# Patient Record
Sex: FEMALE | Race: WHITE | Employment: OTHER | URBAN - METROPOLITAN AREA
[De-identification: names, ages, dates, MRNs, and addresses within clinical notes are randomized per-mention and may not be internally consistent; named-entity substitution may affect disease eponyms.]

---

## 2017-08-08 ENCOUNTER — OFFICE VISIT (OUTPATIENT)
Dept: NEUROSURGERY | Age: 77
End: 2017-08-08

## 2017-08-08 VITALS
TEMPERATURE: 97.9 F | OXYGEN SATURATION: 98 % | SYSTOLIC BLOOD PRESSURE: 125 MMHG | RESPIRATION RATE: 18 BRPM | HEART RATE: 63 BPM | DIASTOLIC BLOOD PRESSURE: 83 MMHG

## 2017-08-08 DIAGNOSIS — I67.1 MIDDLE CEREBRAL ARTERY ANEURYSM: Primary | ICD-10-CM

## 2017-08-08 RX ORDER — LEVOTHYROXINE SODIUM 88 UG/1
88 TABLET ORAL
COMMUNITY
Start: 2017-07-07

## 2017-08-08 RX ORDER — PANTOPRAZOLE SODIUM 40 MG/1
40 TABLET, DELAYED RELEASE ORAL DAILY
COMMUNITY
Start: 2017-06-26 | End: 2019-04-01

## 2017-08-08 NOTE — MR AVS SNAPSHOT
Visit Information Date & Time Provider Department Dept. Phone Encounter #  
 8/8/2017  2:30 PM Lord Gilbert MD [facility] Neurointerventional Surgery [phone] [id number] Follow-up Instructions Return in about 1 year (around 8/8/2018), or MRA and clinic visit. Upcoming Health Maintenance Date Due DTaP/Tdap/Td series (1 - Tdap) 11/18/1962 ZOSTER VACCINE AGE 60> 9/18/2001 GLAUCOMA SCREENING Q2Y 11/18/2006 OSTEOPOROSIS SCREENING (DEXA) 11/18/2006 Pneumococcal 65+ Low/Medium Risk (1 of 2 - PCV13) 11/18/2006 MEDICARE YEARLY EXAM 11/18/2006 INFLUENZA AGE 9 TO ADULT 8/1/2017 Allergies as of 8/8/2017  Review Complete On: 8/8/2017 By: Juan Michelle CNA No Known Allergies Current Immunizations  Reviewed on 5/28/2015 No immunizations on file. Not reviewed this visit You Were Diagnosed With   
  
 Codes Comments Middle cerebral artery aneurysm    -  Primary ICD-10-CM: I67.1 ICD-9-CM: 437.3 Vitals BP Pulse Temp Resp SpO2 OB Status 125/83 (BP 1 Location: Right arm, BP Patient Position: Sitting) 63 97.9 °F (36.6 °C) (Oral) 18 98% Postmenopausal  
 Smoking Status Never Smoker Your Updated Medication List  
  
   
This list is accurate as of: 8/8/17  2:52 PM.  Always use your most recent med list.  
  
  
  
  
 aspirin 325 mg tablet Commonly known as:  ASPIRIN Take 1 Tab by mouth daily. calcium 500 mg Tab Take  by mouth. cyclobenzaprine 10 mg tablet Commonly known as:  FLEXERIL Take 1 Tab by mouth three (3) times daily as needed for Muscle Spasm(s). diclofenac EC 75 mg EC tablet Commonly known as:  VOLTAREN Take 75 mg by mouth. prn  
  
 levothyroxine 88 mcg tablet Commonly known as:  SYNTHROID Take 88 mcg by mouth daily. lisinopril 5 mg tablet Commonly known as:  Heather Jose Take  by mouth daily. pantoprazole 40 mg tablet Commonly known as:  PROTONIX Take 40 mg by mouth daily. TURMERIC (BULK)  
by Does Not Apply route daily. Follow-up Instructions Return in about 1 year (around 8/8/2018), or MRA and clinic visit. To-Do List   
 08/08/2017 Imaging:  MRA BRAIN W CONT Introducing [facility] SERVICES! [facility] introduces [employer] patient portal. Now you can access parts of your medical record, email your doctor's office, and request medication refills online. 1. In your internet browser, go to [email or web address] 2. Click on the First Time User? Click Here link in the Sign In box. You will see the New Member Sign Up page. 3. Enter your [employer] Access Code exactly as it appears below. You will not need to use this code after youve completed the sign-up process. If you do not sign up before the expiration date, you must request a new code. · [employer] Access Code: [email or web address] Expires: 11/6/2017  2:52 PM 
 
4. Enter the last four digits of your Social Security Number (xxxx) and Date of Birth (mm/dd/yyyy) as indicated and click Submit. You will be taken to the next sign-up page. 5. Create a [employer] ID. This will be your [employer] login ID and cannot be changed, so think of one that is secure and easy to remember. 6. Create a [employer] password. You can change your password at any time. 7. Enter your Password Reset Question and Answer. This can be used at a later time if you forget your password. 8. Enter your e-mail address. You will receive e-mail notification when new information is available in [address] 9. Click Sign Up. You can now view and download portions of your medical record. 10. Click the Download Summary menu link to download a portable copy of your medical information. If you have questions, please visit the Frequently Asked Questions section of the [employer] website.  Remember, [employer] is NOT to be used for urgent needs. For medical emergencies, dial 911. Now available from your iPhone and Android! Please provide this summary of care documentation to your next provider. Your primary care clinician is listed as [address]. If you have any questions after today's visit, please call [phone].

## 2018-08-16 ENCOUNTER — HOSPITAL ENCOUNTER (OUTPATIENT)
Dept: MRI IMAGING | Age: 78
Discharge: HOME | End: 2018-08-16
Attending: NURSE PRACTITIONER
Payer: MEDICARE

## 2018-08-16 VITALS — BODY MASS INDEX: 26.29 KG/M2 | WEIGHT: 158 LBS

## 2018-08-16 DIAGNOSIS — I67.1 MIDDLE CEREBRAL ARTERY ANEURYSM: ICD-10-CM

## 2018-08-16 PROCEDURE — 70546 MR ANGIOGRAPH HEAD W/O&W/DYE: CPT

## 2018-08-16 PROCEDURE — 74011250636 HC RX REV CODE- 250/636: Performed by: NURSE PRACTITIONER

## 2018-08-16 PROCEDURE — A9585 GADOBUTROL INJECTION: HCPCS | Performed by: NURSE PRACTITIONER

## 2018-08-16 PROCEDURE — 74011000258 HC RX REV CODE- 258: Performed by: NURSE PRACTITIONER

## 2018-08-16 RX ADMIN — SODIUM CHLORIDE 100 ML: 900 INJECTION, SOLUTION INTRAVENOUS at 09:00

## 2018-08-16 RX ADMIN — GADOBUTROL 7.5 ML: 604.72 INJECTION INTRAVENOUS at 09:00

## 2018-08-17 ENCOUNTER — OFFICE VISIT (OUTPATIENT)
Dept: NEUROSURGERY | Age: 78
End: 2018-08-17

## 2018-08-17 VITALS
HEART RATE: 74 BPM | RESPIRATION RATE: 18 BRPM | WEIGHT: 161 LBS | HEIGHT: 65 IN | TEMPERATURE: 98.6 F | BODY MASS INDEX: 26.82 KG/M2 | OXYGEN SATURATION: 98 % | DIASTOLIC BLOOD PRESSURE: 75 MMHG | SYSTOLIC BLOOD PRESSURE: 136 MMHG

## 2018-08-17 DIAGNOSIS — I67.1 MIDDLE CEREBRAL ARTERY ANEURYSM: Primary | ICD-10-CM

## 2018-08-17 NOTE — LETTER
8/17/2018 10:25 AM 
 
JASON Wright M.D. 
[address] [facility]  [id number] Sacha Coffey RE:  Ms. Adolfo Marycruz Dear Dr. India Salazar, 
 
I had the pleasure of seeing your patient, Ms. Marques Valenzuela, in our clinic today. As you know, Ms. Marques Valenzuela is a 69 y.o. 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  Ms. Marques Valenzuela presented to [facility] on 5/24/2015 for further evaluation of neck pain which had caused her to seek treatment at a [facility].  The neck pain was without n/v, visual deficits, weakness, congestion, dysphagia or dysarthia. At [facility] a CTA demonstrated a macrolobulated aneurysm arising from the distal M1 at its trifurcation measuring 6 mm x 3.5 x 6.5 mm with approximately 1 mm neck. CT head showed no SAH and the aneurysm was felt to be incidental.  However, given its size and irregularity recommendation was to treat the aneurysm endovascularly. She underwent coiling on 5/27/15 and was discharged on 5/28/15.  Follow up MRA on 7/2/15 showed a tiny neck remnant, which has been unchanged on annual MRAs. In October, 2016 she was seen for a single episode of transient global amnesia which resolved and was not associated with focal neurological deficit. On a follow up visit in 2017, she had not expericenced any more TGA episodes.   
  
Since then she has done very well. She denies any episodes of TGA or focal neurological symptoms, no headaches or neck pain and no new medical issues. Her neurological exam today is normal.  I personally reviewed all of her neuroimaging and compared the most recent MRA to the angiogram and previous MRAs. There has been no change in the tiny 1.5 mm neck remnant involving the left MCA anterior division. No intracranial or extracranial ASVD is seen.   
 
Given the stability of the tiny neck remnant and no neurological issues, she can return for her surveillance MRA in two years. Please feel free to contact me at [phone] (office) or [phone](cell) with any questions. Sincerely, Anirudh Ernst M.D. 
  
Medical Director [facility] Neurointerventional Surgery Service Brett Escalante 
Professor, Departments of Radiology, Neurology and Neurological Surgery [facility]

## 2018-08-17 NOTE — MR AVS SNAPSHOT
[address] [facility] [address] [facility] [address] 
[phone] Patient: Leonardo Garner MRN:  [id number] Visit Information Date & Time Provider Department Dept. Phone Encounter #  
 8/17/2018 10:00 AM MD Radha Kemp [facility] Neurointerventional Surgery [phone] [id number] Follow-up Instructions Return in about 2 years (around 8/17/2020). Upcoming Health Maintenance Date Due DTaP/Tdap/Td series (1 - Tdap) 11/18/1962 ZOSTER VACCINE AGE 60> 9/18/2001 GLAUCOMA SCREENING Q2Y 11/18/2006 Bone Densitometry (Dexa) Screening 11/18/2006 Pneumococcal 65+ Low/Medium Risk (1 of 2 - PCV13) 11/18/2006 MEDICARE YEARLY EXAM 3/14/2018 Influenza Age 5 to Adult 8/1/2018 Allergies as of 8/17/2018  Review Complete On: 8/17/2018 By: Giana Alamo CNA No Known Allergies Current Immunizations  Reviewed on 5/28/2015 No immunizations on file. Not reviewed this visit You Were Diagnosed With   
  
 Codes Comments Middle cerebral artery aneurysm    -  Primary ICD-10-CM: I67.1 ICD-9-CM: 437.3 Vitals BP Pulse Temp Resp Height(growth percentile) Weight(growth percentile) 136/75 (BP 1 Location: Right arm, BP Patient Position: Sitting) 74 98.6 °F (37 °C) (Oral) 18 5' 5\" (1.651 m) 161 lb (73 kg) SpO2 BMI OB Status Smoking Status 98% 26.79 kg/m2 Postmenopausal Never Smoker BMI and BSA Data Body Mass Index Body Surface Area  
 26.79 kg/m 2 1.83 m 2 Your Updated Medication List  
  
   
This list is accurate as of 8/17/18 10:14 AM.  Always use your most recent med list.  
  
  
  
  
 aspirin 325 mg tablet Commonly known as:  ASPIRIN Take 1 Tab by mouth daily. calcium 500 mg Tab Take  by mouth. cyclobenzaprine 10 mg tablet Commonly known as:  FLEXERIL Take 1 Tab by mouth three (3) times daily as needed for Muscle Spasm(s). diclofenac EC 75 mg EC tablet Commonly known as:  VOLTAREN Take 75 mg by mouth. prn  
  
 levothyroxine 88 mcg tablet Commonly known as:  SYNTHROID Take 88 mcg by mouth daily. lisinopril 5 mg tablet Commonly known as:  Marge Megan Take  by mouth daily. pantoprazole 40 mg tablet Commonly known as:  PROTONIX Take 40 mg by mouth daily. TURMERIC (BULK)  
by Does Not Apply route daily. Follow-up Instructions Return in about 2 years (around 8/17/2020). Patient Instructions Imaging reviewed with you and there is no change in the tiny neck remnant of the left MCA aneurysm No narrowing of any blood vessels are seen Recommend follow up MRA with and without contrast in 2 years Introducing \[facility] [place] [facility]\"" & [facility]! Michelle Masters introduces [employer] patient portal. Now you can access parts of your medical record, email your doctor's office, and request medication refills online. 1. In your internet browser, go to [email or web address] 2. Click on the First Time User? Click Here link in the Sign In box. You will see the New Member Sign Up page. 3. Enter your [employer] Access Code exactly as it appears below. You will not need to use this code after youve completed the sign-up process. If you do not sign up before the expiration date, you must request a new code. · [employer] Access Code: [email or web address] Expires: 10/28/2018 11:16 AM 
 
4. Enter the last four digits of your Social Security Number (xxxx) and Date of Birth (mm/dd/yyyy) as indicated and click Submit. You will be taken to the next sign-up page. 5. Create a [employer] ID. This will be your [employer] login ID and cannot be changed, so think of one that is secure and easy to remember. 6. Create a [employer] password. You can change your password at any time. 7. Enter your Password Reset Question and Answer. This can be used at a later time if you forget your password. 8. Enter your e-mail address. You will receive e-mail notification when new information is available in [address] 9. Click Sign Up. You can now view and download portions of your medical record. 10. Click the Download Summary menu link to download a portable copy of your medical information. If you have questions, please visit the Frequently Asked Questions section of the [employer] website. Remember, [employer] is NOT to be used for urgent needs. For medical emergencies, dial 911. Now available from your iPhone and Android! Please provide this summary of care documentation to your next provider. Your primary care clinician is listed as [address]. If you have any questions after today's visit, please call [phone].

## 2018-08-17 NOTE — PATIENT INSTRUCTIONS
Imaging reviewed with you and there is no change in the tiny neck remnant of the left MCA aneurysm    No narrowing of any blood vessels are seen    Recommend follow up MRA with and without contrast in 2 years

## 2018-08-17 NOTE — PROGRESS NOTES
Neurointerventional Surgery  Ambulatory Progress Note        Patient: Lorenzo Walton MRN: [id number]  SSN: [id number]    YOB: 1940             HPI:  Ms. Kenyon Pearce is a 77 y.o. Female who comes in today for follow up evaluation of her coiled left MCA anterior temporal artery aneurysm found incidentally after work up for acute onset of R sided neck pain. At that time, she presented to [facility] for further evaluation neck pain without n/v, visual deficits, weakness, congestion, dysphagia or dysarthia. CTA completed demonstrated a macrolobulated aneurysm arising from the distal M1 at its trifurcation. 6 mm x 3.5 x 6.5 mm with approximately 1 mm neck. NIS consulted and it was determined she should be treated per Dr Tasha Byrd, given its size and irregularity. She underwent coiling on 5/27/15 and was discharged on 5/28/15. Follow up MRA on 7/2/15 showed a tiny neck remnant, which is unchanged on 11/18/15. She was seen again for a 90 min episode of transient global amnesia on 10/16/16. MRI and MRA were performed at that time and showed no change in the tiny aneurysm neck remnant and normal brain parenchyma. Because she had the typical annual imaging done at that time, she did not have a repeat study in Dec, 2016. She returns today to review her imaging and for her annual check up.     PMHx includes Goiter, HTN and Sciatica involving the left leg. Previous neck pain has resolved. No more instances of TGA. Neurologic Exam:  Mental Status:                                 Alert and oriented x 4. Appropriate affect, mood and behavior.        Language:                                       Normal fluency, repetition, comprehension and naming.     Cranial Nerves:                            Pupils equal, round and reactive to light. Visual fields full to confrontation. Extraocular movements intact. Facial sensation intact V1 - V3. Full facial strength, no asymmetry. Hearing intact bilaterally. No dysarthria. Tongue protrudes to midline, palate elevates symmetrically. Shoulder shrug 5/5 bilaterally.     Motor:                                                       No pronator drift. Bulk and tone normal.                                                                     5/5 power in all extremities proximally and distally. No involuntary movements.     Sensation:                                                 Sensation intact throughout to light touch.        Coordination & Gait:                                 Normal.    1. Cerebral aneurysm, nonruptured I67.1 437.3 MRA BRAIN W WO CONT   2. Transient global amnesia G45.4 437.7 MRI BRAIN WO CONT         CANCELED: MRA NECK W CONT   3. Neck pain M54.2 723.1 MRA NECK W CONT         CANCELED: MRA NECK W CONT   4. Stroke, recent, without late effect Z86.73 V12.54 MRA NECK W CONT      have personally seen and examined the patient. Elements of my examination included history of present illness, review of systems, review of past medical and surgical history, review of medications, and physical and neurological examination. I have personally documented the findings and my impressions in her note.     Ms. Ricardo Shoemaker experienced a single episode of transient global anmesia last year with no repeat episodes. She had no focal findings.  I personally reviewed her imaging with her. The imaging performed on 10/26/16 shows no change in the 1 mm neck remnant associated with the coiled unruptured left MCA aneurysm. The brain parenchyma is normal.    The patient expressed understanding of the disease process and management plan, and all questions were answered.  Greater than 40 minutes were spent in patient management, greater than half of which was spent in counseling and coordination of care.  Guera Plascencia M.D.  Jorge Standing Director  Joselin Hastings, Departments of Radiology, Neurology and Neurological Surgery  [facility]

## 2018-08-17 NOTE — PROGRESS NOTES
Neurointerventional Surgery  Ambulatory Progress Note      Patient: Albino Hillman MRN: [id number]  SSN: [id number]    YOB: 1940  Age: 69 y.o. Sex: female      History of Present Illness:      Ms. Valdemar Suazo is a 69 y.o. 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 Ms. Valdemar Suazo presented to [facility] on 5/24/2015 for further evaluation of neck pain which caused her to seek treatment at a [facility].  The neck pain was without n/v, visual deficits, weakness, congestion, dysphagia or dysarthia. At [facility] a CTA demonstrated a macrolobulated aneurysm arising from the distal M1 at its trifurcation measuring 6 mm x 3.5 x 6.5 mm with approximately 1 mm neck. CT head showed no SAH and the aneurysm was felt to be incidental.  However, given its size and irregularity recommendation was to treat the aneurysm endovascularly. She underwent coiling on 5/27/15 and was discharged on 5/28/15. Follow up MRA on 7/2/15 showed a tiny neck remnant, which has been unchanged on annual MRAs. In the interim she was seen for a single episode of transient global amnesia which resolved and was not associated with focal neurological deficit. On a follow up visit in 2017, she had not expericenced any more TGA episodes. Since then she has done very well. No episodes of TGA or focal neurological symptoms. No headaches or neck pain. No new medical issues.     PMHx includes Goiter, HTN and Sciatica involving the left leg. Previous neck pain has resolved.         Review of Systems    A comprehensive ROS was performed and was negative except for as per HPI. Objective:     Current Outpatient Prescriptions   Medication Sig Dispense Refill    levothyroxine (SYNTHROID) 88 mcg tablet Take 88 mcg by mouth daily.  pantoprazole (PROTONIX) 40 mg tablet Take 40 mg by mouth daily.       diclofenac EC (VOLTAREN) 75 mg EC tablet Take 75 mg by mouth. prn      TURMERIC, BULK, by Does Not Apply route daily.  calcium 500 mg tab Take  by mouth.  aspirin (ASPIRIN) 325 mg tablet Take 1 Tab by mouth daily. 30 Tab 0    lisinopril (PRINIVIL, ZESTRIL) 5 mg tablet Take  by mouth daily.  cyclobenzaprine (FLEXERIL) 10 mg tablet Take 1 Tab by mouth three (3) times daily as needed for Muscle Spasm(s). 20 Tab 0        There were no vitals taken for this visit. No Known Allergies    Current Outpatient Prescriptions   Medication Sig    levothyroxine (SYNTHROID) 88 mcg tablet Take 88 mcg by mouth daily.  pantoprazole (PROTONIX) 40 mg tablet Take 40 mg by mouth daily.  diclofenac EC (VOLTAREN) 75 mg EC tablet Take 75 mg by mouth. prn    TURMERIC, BULK, by Does Not Apply route daily.  calcium 500 mg tab Take  by mouth.  aspirin (ASPIRIN) 325 mg tablet Take 1 Tab by mouth daily.  lisinopril (PRINIVIL, ZESTRIL) 5 mg tablet Take  by mouth daily.  cyclobenzaprine (FLEXERIL) 10 mg tablet Take 1 Tab by mouth three (3) times daily as needed for Muscle Spasm(s). No current facility-administered medications for this visit. Neurologic Exam:  Mental Status:  Alert and oriented x 4. Appropriate affect, mood and behavior. Language:    Normal fluency, repetition, comprehension and naming. Cranial Nerves:   Pupils equal, round and reactive to light. Visual fields full to confrontation. Extraocular movements intact. Facial sensation intact V1 - V3. Full facial strength, no asymmetry. Hearing intact bilaterally. No dysarthria. Tongue protrudes to midline, palate elevates symmetrically. Shoulder shrug 5/5 bilaterally. Motor:    No pronator drift. Bulk and tone normal.      5/5 power in all extremities proximally and distally. No involuntary movements. Sensation:    Sensation intact throughout to light touch.       Coordination & Gait: Normal, tandem gait normal, FTN and HTS intact. Labs:  Lab Results   Component Value Date/Time    Sodium 143 05/28/2015 04:12 AM    Potassium 4.2 05/28/2015 04:12 AM    Chloride 111 (H) 05/28/2015 04:12 AM    CO2 24 05/28/2015 04:12 AM    Anion gap 8 05/28/2015 04:12 AM    Glucose 120 (H) 05/28/2015 04:12 AM    BUN 10 05/28/2015 04:12 AM    Creatinine 0.55 05/28/2015 04:12 AM    BUN/Creatinine ratio 18 05/28/2015 04:12 AM    GFR est AA >60 05/28/2015 04:12 AM    GFR est non-AA >60 05/28/2015 04:12 AM    Calcium 8.5 05/28/2015 04:12 AM     Lab Results   Component Value Date/Time    WBC 6.6 05/28/2015 04:12 AM    Hemoglobin (POC) 15.3 05/24/2015 04:53 PM    HGB 13.1 05/28/2015 04:12 AM    Hematocrit (POC) 45 05/24/2015 04:53 PM    HCT 39.0 05/28/2015 04:12 AM    PLATELET 341 63/27/3196 04:12 AM    MCV 89.0 05/28/2015 04:12 AM     Lab Results   Component Value Date/Time    MCH 29.9 05/28/2015 04:12 AM    MCHC 33.6 05/28/2015 04:12 AM       IMAGING:    I personally reviewed all of her neuroimaging and compared the most recent MRA to the angiogram and previous MRAs. There has been no change in the tiny 1.5 mm neck remnant involving the left MCA anterior division. No intracranial or extracranial ASVD. 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 Large bilateral PCAs with small basilar and small P1 segments. Mra Brain W Wo Cont    Result Date: 8/16/2018  IMPRESSION:  1. Stable postprocedural changes of left MCA trifurcation aneurysm coiling without evidence of recurrent aneurysm. 2. 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 3. Small air-fluid level in the right maxillary sinus. Correlate clinically for acute sinusitis. Assessment/Plan:     Encounter Diagnoses   Name Primary?  Middle cerebral artery aneurysm Yes     1. Pt with stable tiny neck remnant involving coiled left MCA aneurysm, unchanged     --follow up MRA w/wo Gd in 2 years    2. Stroke education   - best medical therapy for stroke prevention utilized and should be continued for life     - signs and sx of stroke discussed and the importance of the activation of EMS, patient verbalized understanding     - patient given opportunity to review images and ask questions , with information provided. Follow-up Disposition:  I have personally seen and examined the patient. I have personally reviewed the chart and images. Elements of my examination included history of present illness, review of systems, review of past medical and surgical history, review of medications, and physical and neurological examination. I have personally rfecorded the findings and impressions in my note. The patient expressed understanding of the disease process and management plan, and all questions were answered. Greater than 40 minutes were spent in patient management, greater than half of which was spent in counseling and coordination of care. Colonel Sammy M.D.     Medical Director  [facility]/[address] Neurointerventional Surgery Service  Rach Burrell    Professor, Departments of Radiology, Neurology and Neurological Surgery  [address]urt MD

## 2019-04-01 RX ORDER — NAPROXEN SODIUM 220 MG
220 TABLET ORAL
COMMUNITY

## 2019-04-01 RX ORDER — MELATONIN
1000
COMMUNITY

## 2019-04-01 RX ORDER — GUAIFENESIN 100 MG/5ML
81 LIQUID (ML) ORAL
COMMUNITY

## 2019-04-01 RX ORDER — ASCORBIC ACID 500 MG
500 TABLET ORAL
COMMUNITY

## 2019-04-01 NOTE — PERIOP NOTES
TWO IDENTIFIERS VERIFIED PRIOR TO CONDUCTING PHONE INTERVIEW . PRE-OP AND MEDICATION INSTRUCTIONS REVIEWED WITH PATIENT AND  . SURGICAL SITE INFECTION SHEET REVIEWED . PATIENT GIVEN OPPORTUNITY TO ASK QUESTIONS. VERBALIZED UNDERSTANDING OF INFORMATION DISCUSSED VIA PHONE .

## 2019-04-03 ENCOUNTER — HOSPITAL ENCOUNTER (OUTPATIENT)
Age: 79
Setting detail: OUTPATIENT SURGERY
Discharge: HOME | End: 2019-04-03
Attending: ORTHOPAEDIC SURGERY | Admitting: ORTHOPAEDIC SURGERY
Payer: MEDICARE

## 2019-04-03 ENCOUNTER — ANESTHESIA (OUTPATIENT)
Dept: SURGERY | Age: 79
End: 2019-04-03
Payer: MEDICARE

## 2019-04-03 ENCOUNTER — ANESTHESIA EVENT (OUTPATIENT)
Dept: SURGERY | Age: 79
End: 2019-04-03
Payer: MEDICARE

## 2019-04-03 VITALS
RESPIRATION RATE: 16 BRPM | HEART RATE: 60 BPM | HEIGHT: 66 IN | OXYGEN SATURATION: 96 % | SYSTOLIC BLOOD PRESSURE: 122 MMHG | DIASTOLIC BLOOD PRESSURE: 58 MMHG | WEIGHT: 160 LBS | BODY MASS INDEX: 25.71 KG/M2 | TEMPERATURE: 98 F

## 2019-04-03 DIAGNOSIS — M20.11 HALLUX VALGUS (ACQUIRED), RIGHT FOOT: Primary | ICD-10-CM

## 2019-04-03 LAB
BASOPHILS # BLD: 0.1 K/UL (ref 0–0.1)
BASOPHILS NFR BLD: 1 % (ref 0–1)
DIFFERENTIAL METHOD BLD: NORMAL
EOSINOPHIL # BLD: 0.1 K/UL (ref 0–0.4)
EOSINOPHIL NFR BLD: 1 % (ref 0–7)
ERYTHROCYTE [DISTWIDTH] IN BLOOD BY AUTOMATED COUNT: 13.1 % (ref 11.5–14.5)
HCT VFR BLD AUTO: 44.8 % (ref 35–47)
HGB BLD-MCNC: 14 G/DL (ref 11.5–16)
IMM GRANULOCYTES # BLD AUTO: 0 K/UL (ref 0–0.04)
IMM GRANULOCYTES NFR BLD AUTO: 0 % (ref 0–0.5)
LYMPHOCYTES # BLD: 2.1 K/UL (ref 0.8–3.5)
LYMPHOCYTES NFR BLD: 36 % (ref 12–49)
MCH RBC QN AUTO: 29 PG (ref 26–34)
MCHC RBC AUTO-ENTMCNC: 31.3 G/DL (ref 30–36.5)
MCV RBC AUTO: 92.9 FL (ref 80–99)
MONOCYTES # BLD: 0.7 K/UL (ref 0–1)
MONOCYTES NFR BLD: 11 % (ref 5–13)
NEUTS SEG # BLD: 3 K/UL (ref 1.8–8)
NEUTS SEG NFR BLD: 51 % (ref 32–75)
NRBC # BLD: 0 K/UL (ref 0–0.01)
NRBC BLD-RTO: 0 PER 100 WBC
PLATELET # BLD AUTO: 205 K/UL (ref 150–400)
PMV BLD AUTO: 9.6 FL (ref 8.9–12.9)
RBC # BLD AUTO: 4.82 M/UL (ref 3.8–5.2)
WBC # BLD AUTO: 5.8 K/UL (ref 3.6–11)

## 2019-04-03 PROCEDURE — 74011250636 HC RX REV CODE- 250/636: Performed by: ORTHOPAEDIC SURGERY

## 2019-04-03 PROCEDURE — 77030018836 HC SOL IRR NACL ICUM -A: Performed by: ORTHOPAEDIC SURGERY

## 2019-04-03 PROCEDURE — 36415 COLL VENOUS BLD VENIPUNCTURE: CPT

## 2019-04-03 PROCEDURE — 74011250636 HC RX REV CODE- 250/636

## 2019-04-03 PROCEDURE — 93005 ELECTROCARDIOGRAM TRACING: CPT

## 2019-04-03 PROCEDURE — 74011250636 HC RX REV CODE- 250/636: Performed by: ANESTHESIOLOGY

## 2019-04-03 PROCEDURE — 74011250637 HC RX REV CODE- 250/637: Performed by: ANESTHESIOLOGY

## 2019-04-03 PROCEDURE — 76010000138 HC OR TIME 0.5 TO 1 HR: Performed by: ORTHOPAEDIC SURGERY

## 2019-04-03 PROCEDURE — 77030020754 HC CUF TRNQT 2BLA STRY -B: Performed by: ORTHOPAEDIC SURGERY

## 2019-04-03 PROCEDURE — 85025 COMPLETE CBC W/AUTO DIFF WBC: CPT

## 2019-04-03 PROCEDURE — 77030020782 HC GWN BAIR PAWS FLX 3M -B

## 2019-04-03 PROCEDURE — 77030010396 HC WRE FIX C CNMD -A: Performed by: ORTHOPAEDIC SURGERY

## 2019-04-03 PROCEDURE — 76210000020 HC REC RM PH II FIRST 0.5 HR: Performed by: ORTHOPAEDIC SURGERY

## 2019-04-03 PROCEDURE — 77030002916 HC SUT ETHLN J&J -A: Performed by: ORTHOPAEDIC SURGERY

## 2019-04-03 PROCEDURE — 74011000250 HC RX REV CODE- 250

## 2019-04-03 PROCEDURE — 77030031139 HC SUT VCRL2 J&J -A: Performed by: ORTHOPAEDIC SURGERY

## 2019-04-03 PROCEDURE — 76210000063 HC OR PH I REC FIRST 0.5 HR: Performed by: ORTHOPAEDIC SURGERY

## 2019-04-03 PROCEDURE — 76060000033 HC ANESTHESIA 1 TO 1.5 HR: Performed by: ORTHOPAEDIC SURGERY

## 2019-04-03 PROCEDURE — 77030011640 HC PAD GRND REM COVD -A: Performed by: ORTHOPAEDIC SURGERY

## 2019-04-03 PROCEDURE — 77030011992 HC AIRWY NASOPHGL TELE -A: Performed by: ANESTHESIOLOGY

## 2019-04-03 PROCEDURE — 77030002933 HC SUT MCRYL J&J -A: Performed by: ORTHOPAEDIC SURGERY

## 2019-04-03 RX ORDER — LIDOCAINE HYDROCHLORIDE 20 MG/ML
INJECTION, SOLUTION EPIDURAL; INFILTRATION; INTRACAUDAL; PERINEURAL AS NEEDED
Status: DISCONTINUED | OUTPATIENT
Start: 2019-04-03 | End: 2019-04-03 | Stop reason: HOSPADM

## 2019-04-03 RX ORDER — FENTANYL CITRATE 50 UG/ML
25 INJECTION, SOLUTION INTRAMUSCULAR; INTRAVENOUS
Status: CANCELLED | OUTPATIENT
Start: 2019-04-03

## 2019-04-03 RX ORDER — OXYCODONE HYDROCHLORIDE 5 MG/1
5 TABLET ORAL AS NEEDED
Status: CANCELLED | OUTPATIENT
Start: 2019-04-03

## 2019-04-03 RX ORDER — SODIUM CHLORIDE 9 MG/ML
25 INJECTION, SOLUTION INTRAVENOUS CONTINUOUS
Status: DISCONTINUED | OUTPATIENT
Start: 2019-04-03 | End: 2019-04-03 | Stop reason: HOSPADM

## 2019-04-03 RX ORDER — SCOLOPAMINE TRANSDERMAL SYSTEM 1 MG/1
1 PATCH, EXTENDED RELEASE TRANSDERMAL ONCE
Status: DISCONTINUED | OUTPATIENT
Start: 2019-04-03 | End: 2019-04-03 | Stop reason: HOSPADM

## 2019-04-03 RX ORDER — MIDAZOLAM HYDROCHLORIDE 1 MG/ML
0.5 INJECTION, SOLUTION INTRAMUSCULAR; INTRAVENOUS
Status: CANCELLED | OUTPATIENT
Start: 2019-04-03

## 2019-04-03 RX ORDER — DEXMEDETOMIDINE HYDROCHLORIDE 4 UG/ML
INJECTION, SOLUTION INTRAVENOUS AS NEEDED
Status: DISCONTINUED | OUTPATIENT
Start: 2019-04-03 | End: 2019-04-03 | Stop reason: HOSPADM

## 2019-04-03 RX ORDER — SODIUM CHLORIDE, SODIUM LACTATE, POTASSIUM CHLORIDE, CALCIUM CHLORIDE 600; 310; 30; 20 MG/100ML; MG/100ML; MG/100ML; MG/100ML
INJECTION, SOLUTION INTRAVENOUS
Status: DISCONTINUED | OUTPATIENT
Start: 2019-04-03 | End: 2019-04-03 | Stop reason: HOSPADM

## 2019-04-03 RX ORDER — SODIUM CHLORIDE 9 MG/ML
25 INJECTION, SOLUTION INTRAVENOUS CONTINUOUS
Status: CANCELLED | OUTPATIENT
Start: 2019-04-03

## 2019-04-03 RX ORDER — PROPOFOL 10 MG/ML
INJECTION, EMULSION INTRAVENOUS AS NEEDED
Status: DISCONTINUED | OUTPATIENT
Start: 2019-04-03 | End: 2019-04-03 | Stop reason: HOSPADM

## 2019-04-03 RX ORDER — MORPHINE SULFATE 10 MG/ML
2 INJECTION, SOLUTION INTRAMUSCULAR; INTRAVENOUS
Status: CANCELLED | OUTPATIENT
Start: 2019-04-03

## 2019-04-03 RX ORDER — LIDOCAINE HYDROCHLORIDE 10 MG/ML
0.1 INJECTION, SOLUTION EPIDURAL; INFILTRATION; INTRACAUDAL; PERINEURAL AS NEEDED
Status: DISCONTINUED | OUTPATIENT
Start: 2019-04-03 | End: 2019-04-03 | Stop reason: HOSPADM

## 2019-04-03 RX ORDER — HYDROMORPHONE HYDROCHLORIDE 1 MG/ML
0.2 INJECTION, SOLUTION INTRAMUSCULAR; INTRAVENOUS; SUBCUTANEOUS
Status: CANCELLED | OUTPATIENT
Start: 2019-04-03 | End: 2019-04-03

## 2019-04-03 RX ORDER — MIDAZOLAM HYDROCHLORIDE 1 MG/ML
INJECTION, SOLUTION INTRAMUSCULAR; INTRAVENOUS AS NEEDED
Status: DISCONTINUED | OUTPATIENT
Start: 2019-04-03 | End: 2019-04-03 | Stop reason: HOSPADM

## 2019-04-03 RX ORDER — CEPHALEXIN 500 MG/1
500 CAPSULE ORAL 4 TIMES DAILY
Qty: 4 CAP | Refills: 0 | Status: SHIPPED | OUTPATIENT
Start: 2019-04-03 | End: 2019-04-04

## 2019-04-03 RX ORDER — SODIUM CHLORIDE, SODIUM LACTATE, POTASSIUM CHLORIDE, CALCIUM CHLORIDE 600; 310; 30; 20 MG/100ML; MG/100ML; MG/100ML; MG/100ML
1000 INJECTION, SOLUTION INTRAVENOUS CONTINUOUS
Status: DISCONTINUED | OUTPATIENT
Start: 2019-04-03 | End: 2019-04-03 | Stop reason: HOSPADM

## 2019-04-03 RX ORDER — ROPIVACAINE HYDROCHLORIDE 5 MG/ML
150 INJECTION, SOLUTION EPIDURAL; INFILTRATION; PERINEURAL AS NEEDED
Status: DISCONTINUED | OUTPATIENT
Start: 2019-04-03 | End: 2019-04-03 | Stop reason: HOSPADM

## 2019-04-03 RX ORDER — FENTANYL CITRATE 50 UG/ML
50 INJECTION, SOLUTION INTRAMUSCULAR; INTRAVENOUS AS NEEDED
Status: DISCONTINUED | OUTPATIENT
Start: 2019-04-03 | End: 2019-04-03 | Stop reason: HOSPADM

## 2019-04-03 RX ORDER — ONDANSETRON 2 MG/ML
4 INJECTION INTRAMUSCULAR; INTRAVENOUS AS NEEDED
Status: CANCELLED | OUTPATIENT
Start: 2019-04-03

## 2019-04-03 RX ORDER — HYDROCODONE BITARTRATE AND ACETAMINOPHEN 5; 325 MG/1; MG/1
1 TABLET ORAL
Qty: 30 TAB | Refills: 0 | Status: SHIPPED | OUTPATIENT
Start: 2019-04-03 | End: 2019-04-06

## 2019-04-03 RX ORDER — SODIUM CHLORIDE, SODIUM LACTATE, POTASSIUM CHLORIDE, CALCIUM CHLORIDE 600; 310; 30; 20 MG/100ML; MG/100ML; MG/100ML; MG/100ML
100 INJECTION, SOLUTION INTRAVENOUS CONTINUOUS
Status: CANCELLED | OUTPATIENT
Start: 2019-04-03

## 2019-04-03 RX ORDER — MIDAZOLAM HYDROCHLORIDE 1 MG/ML
1 INJECTION, SOLUTION INTRAMUSCULAR; INTRAVENOUS AS NEEDED
Status: DISCONTINUED | OUTPATIENT
Start: 2019-04-03 | End: 2019-04-03 | Stop reason: HOSPADM

## 2019-04-03 RX ORDER — FENTANYL CITRATE 50 UG/ML
INJECTION, SOLUTION INTRAMUSCULAR; INTRAVENOUS AS NEEDED
Status: DISCONTINUED | OUTPATIENT
Start: 2019-04-03 | End: 2019-04-03 | Stop reason: HOSPADM

## 2019-04-03 RX ORDER — EPHEDRINE SULFATE/0.9% NACL/PF 50 MG/5 ML
5 SYRINGE (ML) INTRAVENOUS AS NEEDED
Status: CANCELLED | OUTPATIENT
Start: 2019-04-03

## 2019-04-03 RX ORDER — CEFAZOLIN SODIUM/WATER 2 G/20 ML
2 SYRINGE (ML) INTRAVENOUS ONCE
Status: COMPLETED | OUTPATIENT
Start: 2019-04-03 | End: 2019-04-03

## 2019-04-03 RX ORDER — ROPIVACAINE HYDROCHLORIDE 5 MG/ML
INJECTION, SOLUTION EPIDURAL; INFILTRATION; PERINEURAL
Status: COMPLETED | OUTPATIENT
Start: 2019-04-03 | End: 2019-04-03

## 2019-04-03 RX ORDER — PROPOFOL 10 MG/ML
INJECTION, EMULSION INTRAVENOUS
Status: DISCONTINUED | OUTPATIENT
Start: 2019-04-03 | End: 2019-04-03 | Stop reason: HOSPADM

## 2019-04-03 RX ORDER — ACETAMINOPHEN 325 MG/1
650 TABLET ORAL ONCE
Status: COMPLETED | OUTPATIENT
Start: 2019-04-03 | End: 2019-04-03

## 2019-04-03 RX ADMIN — FENTANYL CITRATE 50 MCG: 50 INJECTION, SOLUTION INTRAMUSCULAR; INTRAVENOUS at 12:55

## 2019-04-03 RX ADMIN — DEXMEDETOMIDINE HYDROCHLORIDE 4 MCG: 4 INJECTION, SOLUTION INTRAVENOUS at 13:15

## 2019-04-03 RX ADMIN — MIDAZOLAM HYDROCHLORIDE 2 MG: 1 INJECTION, SOLUTION INTRAMUSCULAR; INTRAVENOUS at 12:55

## 2019-04-03 RX ADMIN — ACETAMINOPHEN 650 MG: 325 TABLET ORAL at 12:50

## 2019-04-03 RX ADMIN — LIDOCAINE HYDROCHLORIDE 60 MG: 20 INJECTION, SOLUTION EPIDURAL; INFILTRATION; INTRACAUDAL; PERINEURAL at 13:10

## 2019-04-03 RX ADMIN — DEXMEDETOMIDINE HYDROCHLORIDE 4 MCG: 4 INJECTION, SOLUTION INTRAVENOUS at 13:31

## 2019-04-03 RX ADMIN — SODIUM CHLORIDE, SODIUM LACTATE, POTASSIUM CHLORIDE, AND CALCIUM CHLORIDE 1000 ML: 600; 310; 30; 20 INJECTION, SOLUTION INTRAVENOUS at 12:50

## 2019-04-03 RX ADMIN — DEXMEDETOMIDINE HYDROCHLORIDE 4 MCG: 4 INJECTION, SOLUTION INTRAVENOUS at 13:37

## 2019-04-03 RX ADMIN — PROPOFOL 30 MG: 10 INJECTION, EMULSION INTRAVENOUS at 13:10

## 2019-04-03 RX ADMIN — FENTANYL CITRATE 25 MCG: 50 INJECTION, SOLUTION INTRAMUSCULAR; INTRAVENOUS at 13:25

## 2019-04-03 RX ADMIN — Medication 2 G: at 13:12

## 2019-04-03 RX ADMIN — ROPIVACAINE HYDROCHLORIDE 30 ML: 5 INJECTION, SOLUTION EPIDURAL; INFILTRATION; PERINEURAL at 13:01

## 2019-04-03 RX ADMIN — SODIUM CHLORIDE, SODIUM LACTATE, POTASSIUM CHLORIDE, CALCIUM CHLORIDE: 600; 310; 30; 20 INJECTION, SOLUTION INTRAVENOUS at 12:55

## 2019-04-03 RX ADMIN — PROPOFOL 50 MCG/KG/MIN: 10 INJECTION, EMULSION INTRAVENOUS at 13:11

## 2019-04-03 NOTE — ANESTHESIA POSTPROCEDURE EVALUATION
Post-Anesthesia Evaluation and Assessment Patient: Clarisse Olivarez MRN: [id number]  SSN: [id number] YOB: 1940  Age: 69 y.o. Sex: female I have evaluated the patient and they are stable and ready for discharge from the PACU. Cardiovascular Function/Vital Signs Visit Vitals /62 Pulse (!) 53 Temp 36.1 °C (97 °F) Resp 12 Ht 5' 5.5\" (1.664 m) Wt 72.6 kg (160 lb) SpO2 99% BMI 26.22 kg/m² Patient is status post Other anesthesia for Procedure(s): CORRECTION HALLUS VALGUS WITH CHEVRRON OSTEOTOMY RIGHT (CHOICE BLOCK). Nausea/Vomiting: None Postoperative hydration reviewed and adequate. Pain: 
Pain Scale 1: Numeric (0 - 10) (04/03/19 1219) Pain Intensity 1: 0 (04/03/19 1219) Managed Neurological Status:  
Neuro (WDL): Within Defined Limits (04/03/19 1305) At baseline Mental Status, Level of Consciousness: Alert and  oriented to person, place, and time Pulmonary Status:  
O2 Device: CO2 nasal cannula (04/03/19 1410) Adequate oxygenation and airway patent Complications related to anesthesia: None Post-anesthesia assessment completed. No concerns Signed By: Willie La MD   
 April 3, 2019 Procedure(s): CORRECTION HALLUS VALGUS WITH CHEVRRON OSTEOTOMY RIGHT (CHOICE BLOCK). regional, MAC 
 
<BSHSIANPOST> Vitals Value Taken Time /62 4/3/2019  2:20 PM  
Temp 36.1 °C (97 °F) 4/3/2019  2:10 PM  
Pulse 52 4/3/2019  2:26 PM  
Resp 11 4/3/2019  2:26 PM  
SpO2 99 % 4/3/2019  2:26 PM  
Vitals shown include unvalidated device data.

## 2019-04-03 NOTE — BRIEF OP NOTE
BRIEF OPERATIVE NOTE Date of Procedure: 4/3/2019 Preoperative Diagnosis: HALLUS VALGUS RIGHT FOOT Postoperative Diagnosis: HALLUS VALGUS RIGHT FOOT Procedure(s): CORRECTION HALLUS VALGUS WITH CHEVRRON OSTEOTOMY RIGHT (CHOICE BLOCK) Surgeon(s) and Role: Kamar Romero MD - Primary Surgical Assistant: none Surgical Staff: 
Circ-1: Abdulkadir Ward RN Scrub Tech-1: Derek Chang Surg Asst-1: Praful Gonzales Event Time In Time Out Incision Start 1316 Incision Close 1350 Anesthesia: Other Estimated Blood Loss: min Specimens: * No specimens in log * Findings: as abvoe Complications: none Implants: * No implants in log *

## 2019-04-03 NOTE — ANESTHESIA PREPROCEDURE EVALUATION
Relevant Problems No relevant active problems Anesthetic History PONV Review of Systems / Medical History Patient summary reviewed, nursing notes reviewed and pertinent labs reviewed Pulmonary Within defined limits Neuro/Psych Within defined limits Cardiovascular Hypertension GI/Hepatic/Renal 
Within defined limits Endo/Other Hypothyroidism Arthritis Other Findings Physical Exam 
 
Airway Mallampati: II 
TM Distance: > 6 cm Neck ROM: normal range of motion Mouth opening: Normal 
 
 Cardiovascular Regular rate and rhythm,  S1 and S2 normal,  no murmur, click, rub, or gallop Dental 
No notable dental hx Pulmonary Breath sounds clear to auscultation Abdominal 
GI exam deferred Other Findings Anesthetic Plan ASA: 2 Anesthesia type: regional - ankle block Induction: Intravenous Anesthetic plan and risks discussed with: Patient

## 2019-04-03 NOTE — ANESTHESIA PROCEDURE NOTES
Peripheral Block    Start time: 4/3/2019 12:54 PM  End time: 4/3/2019 1:00 PM  Performed by: Halley Vallejo MD  Authorized by: Halley Vallejo MD       Pre-procedure: Indications: primary anesthetic    Preanesthetic Checklist: patient identified, risks and benefits discussed, site marked, timeout performed, anesthesia consent given and patient being monitored    Timeout Time: 12:54          Block Type:   Block Type:   Ankle  Laterality:  Right  Monitoring:  Standard ASA monitoring, heart rate, frequent vital sign checks, continuous pulse ox, oxygen and responsive to questions  Injection Technique:  Single shot  Procedures: other (comment)    Patient Position: supine  Prep: chlorhexidine    Location:  Foot  Needle Gauge:  22 G  Needle Localization:  Anatomical landmarks    Assessment:  Number of attempts:  1  Injection Assessment:  Incremental injection every 5 mL, negative aspiration for CSF, negative aspiration for blood, no intravascular symptoms and no paresthesia  Patient tolerance:  Patient tolerated the procedure well with no immediate complications

## 2019-04-03 NOTE — PERIOP NOTES
Patient: Juliana Decker MRN: [id number]  SSN: [id number] YOB: 1940  Age: 69 y.o. Sex: female Patient is status post Procedure(s): CORRECTION HALLUS VALGUS WITH CHEVRRON OSTEOTOMY RIGHT (CHOICE BLOCK). Surgeon(s) and Role: Yolanda Kussmaul, MD - Primary Local/Dose/Irrigation: BLOCK Peripheral IV 04/03/19 Left Forearm (Active) Site Assessment Clean, dry, & intact 4/3/2019 12:52 PM  
Phlebitis Assessment 0 4/3/2019 12:52 PM  
Infiltration Assessment 0 4/3/2019 12:52 PM  
Dressing Status Clean, dry, & intact 4/3/2019 12:52 PM  
Dressing Type Transparent;Tape 4/3/2019 12:52 PM  
Hub Color/Line Status Pink; Infusing 4/3/2019 12:52 PM  
                 
 
 
 
Dressing/Packing:    
Splint/Cast:  ] Other:

## 2019-04-03 NOTE — DISCHARGE INSTRUCTIONS
May bear weight on heel in post op shoe  Maintain dressing until follow up           Dr. Davide Cotto Postoperative Patient Instructions    1. Please maintain the dressing and/or splint placed at surgery until your follow up appointment. We will remove it at your appointment. Please keep the dressing clean and dry. If you have any questions or problems with your dressing, please call our office at [phone].  2. Please elevate the operative extremity to the level of the heart to keep swelling at a minimum. You may get up to move around, but when seated please keep the extremity elevated as much as possible. This will decrease swelling and postoperative pain. 3. If you were told to be non-weight bearing following surgery, please do not place the foot on the ground. You may use crutches or we can help arrange for a scooter for you to stay off of your operative leg. 4. If you are in a special shoe or boot and told that you may bear weight as tolerated, then you may do so, but please keep the extremity elevated when not moving around. 5. If you had a block from the Anesthesia doctor before your surgery, expect this to wear off around 12-24 hours after you received it and you should start taking your pain medication when the feeling begins to come back into your leg. 6. A prescription for pain medicine is provided. The key to pain control is staying ahead. For the first 48-72 hours after your surgery, you may want to take your pain medication of a regular schedule. After that, you may only need it on an as-needed basis. 7. Please begin taking one Enteric Coated Aspirin 325 mg daily on the morning after your surgery until you are told you do not need to do this anymore. This can lower the risk of developing a blood clot after surgery. If you are not sure if you can take an Aspirin daily, please check with your primary care doctor to verify.   8. Signs and symptoms of infection include: redness, increased pain, increased swelling not relieved by elevation, drainage, fever, or chills. If you develop any of these symptoms, call the office at [phone]. 9. Please follow-up at my office at 12-15 days after surgery or when specified at your pre-operative appointment. Please follow the [address] [place] [address] Discharge Instructions provided to you by Dr. Marcio Marks for your medications.     DATE OF FOLLOW-UP APPOINTMENT: April 17 at 10:30 am at  [address]    Laurence Vieira MD  4/3/2019

## 2019-04-04 LAB
ATRIAL RATE: 59 BPM
CALCULATED P AXIS, ECG09: 65 DEGREES
CALCULATED R AXIS, ECG10: -10 DEGREES
CALCULATED T AXIS, ECG11: 68 DEGREES
DIAGNOSIS, 93000: NORMAL
P-R INTERVAL, ECG05: 194 MS
Q-T INTERVAL, ECG07: 414 MS
QRS DURATION, ECG06: 88 MS
QTC CALCULATION (BEZET), ECG08: 409 MS
VENTRICULAR RATE, ECG03: 59 BPM

## 2019-04-04 NOTE — OP NOTES
[address]  OPERATIVE REPORT    Name:  iFdel Davalos  MR#:  [id number]  :  1940  ACCOUNT #:  [de-identified]  DATE OF SERVICE:  2019    PREOPERATIVE DIAGNOSES:  1. Right foot hallux valgus. 2.  Right foot pain. POSTOPERATIVE DIAGNOSES:  1. Right foot hallux valgus. 2.  Right foot pain. PROCEDURE PERFORMED:  Correction of right foot hallux valgus with Chevron metatarsal osteotomy and distal soft tissue release. SURGEON:  Margarette Clarke MD    ASSISTANT:  none. ANESTHESIA:  General.    COMPLICATIONS:  None. TOURNIQUET TIME:  32 minutes, Esmarch tourniquet, right lower extremity. SPECIMENS REMOVED:  none. IMPLANTS:  k wires. ESTIMATED BLOOD LOSS:  Minimal.    DISPOSITION:  The patient was taken to the recovery room in stable condition. OPERATIVE INDICATIONS:  The patient is a 69-year-old female who I met recently, but has had a longstanding bunion on her right foot, which has gotten progressively more painful over the past year. We discussed operative and nonoperative options. She wished to go forward with surgery for correction as above. Informed consent was obtained including all risks and benefits and she wished to proceed. OPERATIVE PROCEDURE:  The patient identified in the preoperative holding area. Right lower extremity was marked to the patient. All preoperative questions were answered. She was seen by Anesthesia Department. A lower extremity block was performed. She was taken to the operating room, transferred to the operating table in supine position. Once appropriate anesthesia was obtained, the right leg was prepped and draped in the usual sterile fashion. A time-out was conducted indicating correct operative site. The patient received IV Ancef and cephalosporin antibiotics prior to any incision being made. Next, an Esmarch tourniquet was applied, appropriately padding the supramalleolar and placing the tourniquet. Mini-fluoroscopy was brought in and used throughout the case showing the bunion deformity. An incision was made first medial of the medial eminence dissected down to the medial eminence exposing this. The toe would not correct into a varus position, so a lateral first webspace incision was made, dissected down to the lateral portion of the joint where the sesamoid was released and the lateral joint was pie-crusted until I could bring the toe into just a few degrees of varus. Once this was done, then through the medial incision, an oscillating saw was used to remove the medial eminence and then a Chevron metatarsal osteotomy was performed with a distal portion translated one half with laterally at the metatarsal.  A 0.62 K-wire was introduced percutaneous, proximal to this and driven across the osteotomy site and then a second K-wire was placed. Fluoroscopy was used showing appropriate alignment of the osteotomy and the K-wires. The medial overhanging bone was shaved down with an oscillating saw, and capsulorrhaphy was used to further correct the hallux valgus. The two pins were bent, cut, and capped and then both skin incisions were closed with nylon sutures. There was a good correction of the hallux valgus. Once this was completed, a sterile strapping dressing was placed holding the toe in a good position. The patient was awoke and taken to the recovery room in stable condition.       MD LEIA Bonilla/[email or web address]  D:  04/03/2019 13:56  T:  04/03/2019 16:46  JOB #:  [id number]

## 2019-10-14 ENCOUNTER — HOSPITAL ENCOUNTER (OUTPATIENT)
Dept: MRI IMAGING | Age: 79
Discharge: HOME | End: 2019-10-14
Payer: MEDICARE

## 2019-10-14 DIAGNOSIS — M54.16 LUMBAR BACK PAIN WITH RADICULOPATHY AFFECTING RIGHT LOWER EXTREMITY: ICD-10-CM

## 2019-10-14 PROCEDURE — 72148 MRI LUMBAR SPINE W/O DYE: CPT

## 2020-08-14 ENCOUNTER — TELEPHONE (OUTPATIENT)
Dept: NEUROSURGERY | Age: 80
End: 2020-08-14

## 2020-08-14 DIAGNOSIS — I67.1 MIDDLE CEREBRAL ARTERY ANEURYSM: Primary | ICD-10-CM

## 2020-08-14 NOTE — TELEPHONE ENCOUNTER
Patient called in to schedule follow up.  She will need an order for   MRA with and without contrast

## 2020-09-03 ENCOUNTER — HOSPITAL ENCOUNTER (OUTPATIENT)
Dept: MRI IMAGING | Age: 80
Discharge: HOME | End: 2020-09-03
Attending: RADIOLOGY
Payer: MEDICARE

## 2020-09-03 DIAGNOSIS — I67.1 MIDDLE CEREBRAL ARTERY ANEURYSM: ICD-10-CM

## 2020-09-03 PROCEDURE — 70546 MR ANGIOGRAPH HEAD W/O&W/DYE: CPT

## 2020-09-03 PROCEDURE — 74011250636 HC RX REV CODE- 250/636: Performed by: RADIOLOGY

## 2020-09-03 PROCEDURE — 74011000258 HC RX REV CODE- 258: Performed by: RADIOLOGY

## 2020-09-03 PROCEDURE — A9585 GADOBUTROL INJECTION: HCPCS | Performed by: RADIOLOGY

## 2020-09-03 RX ORDER — SODIUM CHLORIDE 0.9 % (FLUSH) 0.9 %
10 SYRINGE (ML) INJECTION
Status: COMPLETED | OUTPATIENT
Start: 2020-09-03 | End: 2020-09-03

## 2020-09-03 RX ADMIN — Medication 10 ML: at 12:00

## 2020-09-03 RX ADMIN — GADOBUTROL 7.5 ML: 604.72 INJECTION INTRAVENOUS at 12:00

## 2020-09-03 RX ADMIN — SODIUM CHLORIDE 100 ML: 900 INJECTION, SOLUTION INTRAVENOUS at 12:00

## 2020-10-06 ENCOUNTER — TELEPHONE (OUTPATIENT)
Dept: NEUROSURGERY | Age: 80
End: 2020-10-06

## 2022-03-18 PROBLEM — I67.1 MIDDLE CEREBRAL ARTERY ANEURYSM: Status: ACTIVE | Noted: 2017-08-08

## 2022-07-08 ENCOUNTER — TELEPHONE (OUTPATIENT)
Dept: NEUROSURGERY | Age: 82
End: 2022-07-08

## 2022-07-08 NOTE — TELEPHONE ENCOUNTER
Patient called in with complaints of dizziness and memory issues. Patient stated that she saw her PCP recently and mentioned same symptoms. Per Dr. Adrianna Angela note patient will be due back in November 2022 for MRA, order will need to entered. Asked patient if she has neurologist and she was under the impression that Dr. Adrianna Angela was her neurologist. Asked patient to reach out to PCP for referral for neurologist and we will schedule her with new provider once MRA is completed.

## 2022-07-14 DIAGNOSIS — I67.1 MIDDLE CEREBRAL ARTERY ANEURYSM: Primary | ICD-10-CM

## 2022-09-02 ENCOUNTER — HOSPITAL ENCOUNTER (OUTPATIENT)
Dept: MRI IMAGING | Age: 82
Discharge: HOME | End: 2022-09-02
Attending: PSYCHIATRY & NEUROLOGY
Payer: MEDICARE

## 2022-09-02 DIAGNOSIS — I67.1 MIDDLE CEREBRAL ARTERY ANEURYSM: ICD-10-CM

## 2022-09-02 PROCEDURE — 74011000258 HC RX REV CODE- 258: Performed by: PSYCHIATRY & NEUROLOGY

## 2022-09-02 PROCEDURE — 70546 MR ANGIOGRAPH HEAD W/O&W/DYE: CPT

## 2022-09-02 PROCEDURE — A9576 INJ PROHANCE MULTIPACK: HCPCS

## 2022-09-02 PROCEDURE — 74011250636 HC RX REV CODE- 250/636

## 2022-09-02 PROCEDURE — 74011000250 HC RX REV CODE- 250: Performed by: PSYCHIATRY & NEUROLOGY

## 2022-09-02 RX ORDER — SODIUM CHLORIDE 0.9 % (FLUSH) 0.9 %
10 SYRINGE (ML) INJECTION
Status: COMPLETED | OUTPATIENT
Start: 2022-09-02 | End: 2022-09-02

## 2022-09-02 RX ADMIN — SODIUM CHLORIDE 100 ML: 900 INJECTION, SOLUTION INTRAVENOUS at 10:48

## 2022-09-02 RX ADMIN — SODIUM CHLORIDE, PRESERVATIVE FREE 10 ML: 5 INJECTION INTRAVENOUS at 10:49

## 2022-09-02 RX ADMIN — GADOTERIDOL 15 ML: 279.3 INJECTION, SOLUTION INTRAVENOUS at 10:48

## 2022-09-16 ENCOUNTER — VIRTUAL VISIT (OUTPATIENT)
Dept: NEUROSURGERY | Age: 82
End: 2022-09-16
Payer: MEDICARE

## 2022-09-16 DIAGNOSIS — I67.1 CEREBRAL ANEURYSM: Primary | ICD-10-CM

## 2022-09-16 PROCEDURE — G2012 BRIEF CHECK IN BY MD/QHP: HCPCS | Performed by: PSYCHIATRY & NEUROLOGY

## 2022-09-16 PROCEDURE — 99441 PR PHYS/QHP TELEPHONE EVALUATION 5-10 MIN: CPT | Performed by: PSYCHIATRY & NEUROLOGY

## 2022-09-16 NOTE — PROGRESS NOTES
Phone visit:    No new issues in last 2 years. 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 She is [de-identified] now.  Would not recommend further surveillance  Follow up prn    Total time: 10 min

## 2022-11-02 ENCOUNTER — OFFICE VISIT (OUTPATIENT)
Dept: ORTHOPEDIC SURGERY | Age: 82
End: 2022-11-02
Payer: MEDICARE

## 2022-11-02 VITALS — BODY MASS INDEX: 27.16 KG/M2 | HEIGHT: 66 IN | WEIGHT: 169 LBS

## 2022-11-02 DIAGNOSIS — M54.41 ACUTE BILATERAL LOW BACK PAIN WITH RIGHT-SIDED SCIATICA: Primary | ICD-10-CM

## 2022-11-02 PROCEDURE — 1123F ACP DISCUSS/DSCN MKR DOCD: CPT | Performed by: PHYSICIAN ASSISTANT

## 2022-11-02 PROCEDURE — 1090F PRES/ABSN URINE INCON ASSESS: CPT | Performed by: PHYSICIAN ASSISTANT

## 2022-11-02 PROCEDURE — G8536 NO DOC ELDER MAL SCRN: HCPCS | Performed by: PHYSICIAN ASSISTANT

## 2022-11-02 PROCEDURE — G8400 PT W/DXA NO RESULTS DOC: HCPCS | Performed by: PHYSICIAN ASSISTANT

## 2022-11-02 PROCEDURE — G8432 DEP SCR NOT DOC, RNG: HCPCS | Performed by: PHYSICIAN ASSISTANT

## 2022-11-02 PROCEDURE — G8419 CALC BMI OUT NRM PARAM NOF/U: HCPCS | Performed by: PHYSICIAN ASSISTANT

## 2022-11-02 PROCEDURE — 1101F PT FALLS ASSESS-DOCD LE1/YR: CPT | Performed by: PHYSICIAN ASSISTANT

## 2022-11-02 PROCEDURE — G8427 DOCREV CUR MEDS BY ELIG CLIN: HCPCS | Performed by: PHYSICIAN ASSISTANT

## 2022-11-02 PROCEDURE — 99213 OFFICE O/P EST LOW 20 MIN: CPT | Performed by: PHYSICIAN ASSISTANT

## 2022-11-02 NOTE — LETTER
11/7/2022    Patient: Jimmy Nowak   YOB: 1940   Date of Visit: 11/2/2022     Melynda Najjar, MD  [address] [place] [id number]  Via Fax: [phone]    Dear Melynda Najjar, MD,      Thank you for referring Ms. Makenzie Amanda to [facility] for evaluation. My notes for this consultation are attached. If you have questions, please do not hesitate to call me. I look forward to following your patient along with you.       Sincerely,    LETI Shi

## 2022-11-02 NOTE — PROGRESS NOTES
1. Have you been to the ER, urgent care clinic since your last visit? Hospitalized since your last visit? No    2. Have you seen or consulted any other health care providers outside of the [address] [place] [address] since your last visit? Include any pap smears or colon screening.  No    Chief Complaint   Patient presents with    Back Pain     Low Back, Right Leg, H/O Lumbar Laminectomy with Dr Sourav Gordon

## 2022-11-07 NOTE — PROGRESS NOTES
Carloz Wu (: 1940) is a [de-identified] y.o. female patient here for evaluation of the following chief complaint(s):  Back Pain (Low Back, Right Leg, H/O Lumbar Laminectomy with Dr Diego Monk)         ASSESSMENT/PLAN:  Below is the assessment and plan developed based on review of pertinent history, physical exam, labs, studies, and medications. 1. Acute bilateral low back pain with right-sided sciatica  -     XR SPINE LUMB MIN 4 V; Future      The patient's radiologic findings have been reviewed with her in detail today. She has a 6-month history of right lateral calf and foot tingling. Her symptoms are improved with activity. She may have some residual or recurrent stenosis which is causing her symptoms. We have discussed various treatment options to include advanced imaging and conservative management with PT and medications. She has declined any further treatment, and will continue with activities as tolerated. She may use over-the-counter medications as needed. She will return to the office on an as-needed basis. Return if symptoms worsen or fail to improve. SUBJECTIVE/OBJECTIVE:  Carloz Wu (: 1940) is a [de-identified] y.o. female who presents today for the following:  Chief Complaint   Patient presents with    Back Pain     Low Back, Right Leg, H/O Lumbar Laminectomy with Dr Diego Monk        Back Pain      Ms. Webb's today as a previous patient to the practice. She is status post prior L2-S1 lumbar laminectomy from Dr. Diego Monk in 2019. She states that approximately 6 months ago, she began with a sudden onset of right leg tingling which has been relatively constant in nature. Her symptoms are improved with activity. Prior to her back surgery, she had left leg tingling. She states that she is not feeling any significant back or leg pain. No left leg symptoms. She has tingling along the lateral aspect of her right calf and into the foot.   There is a slight sensation of numbness along the same distribution. No weakness in her legs. No bowel or bladder changes. IMAGING:  XR Results (most recent):  Results from Appointment encounter on 11/02/22    XR SPINE LUMB MIN 4 V    Narrative  AP, lateral, flexion, and extension films of the lumbar spine reveal no evidence of acute fracture, lytic lesion, or instability. There is prior lumbar laminectomy. Multilevel disc degeneration and spondylosis noted. MRI Results (most recent):       No Known Allergies    Current Outpatient Medications   Medication Sig    naproxen sodium (NAPROSYN) 220 mg tablet Take 220 mg by mouth daily as needed. ascorbic acid, vitamin C, (VITAMIN C) 500 mg tablet Take 500 mg by mouth every morning. cholecalciferol (VITAMIN D3) (1000 Units /25 mcg) tablet Take 1,000 Units by mouth every morning. TURMERIC PO Take 1 Tab by mouth every morning. aspirin 81 mg chewable tablet Take 81 mg by mouth every morning. levothyroxine (SYNTHROID) 88 mcg tablet Take 88 mcg by mouth every morning. calcium 500 mg tab Take 1 Tab by mouth every morning. lisinopril (PRINIVIL, ZESTRIL) 5 mg tablet Take  by mouth every morning. No current facility-administered medications for this visit.        Past Medical History:   Diagnosis Date    Aneurysm ([address]     Arthritis     THUMBS    Chronic pain     SCIATIC ON LEFT SIDE    Goiter     Hypertension     Nausea & vomiting     Platelet inhibition due to Plavix     Sciatic leg pain         Past Surgical History:   Procedure Laterality Date    HX CHOLECYSTECTOMY      HX ORTHOPAEDIC      right wrist metal plate placed    HX OTHER SURGICAL      ANEURYSM PACKED WITH PLATINUM ABOUT FOUR YEARS AGO @ [facility] BY DR. Amber Shelton    HX PARTIAL THYROIDECTOMY      IL BREAST SURGERY PROCEDURE UNLISTED Left     BREAST -CYST REMOVED 21 YEARS AGO       Family History   Problem Relation Age of Onset    Heart Attack Mother     No Known Problems Sister     No Known Problems Sister     Heart Attack Daughter Depression Daughter     No Known Problems Daughter     Anesth Problems Neg Hx         Social History     Tobacco Use    Smoking status: Never    Smokeless tobacco: Never   Substance Use Topics    Alcohol use: Yes     Alcohol/week: 0.8 standard drinks     Types: 1 Glasses of wine per week     Comment: OCCASIONALLY        Review of Systems   Constitutional: Negative. Respiratory: Negative. Cardiovascular: Negative. Gastrointestinal: Negative. Endocrine: Negative. Genitourinary: Negative. Musculoskeletal:  Negative for back pain. Skin: Negative. Allergic/Immunologic: Negative. Hematological: Negative. Psychiatric/Behavioral: Negative. All other systems reviewed and are negative. No flowsheet data found. Vitals:  Ht 5' 6\" (1.676 m)   Wt 169 lb (76.7 kg)   BMI 27.28 kg/m²    Body mass index is 27.28 kg/m². Physical Exam    Neurologic  Sensory  Light Touch - Intact - Globally. Overall Assessment of Muscle Strength and Tone reveals  Lower Extremities - Right Iliopsoas - 5/5. Left Iliopsoas - 5/5. Right Tibialis Anterior - 5/5. Left Tibialis Anterior - 5/5. Right Gastroc-Soleus - 5/5. Left Gastroc-Soleus - 5/5. Right EHL - 5/5. Left EHL - 5/5. General Assessment of Reflexes  Right Ankle - Clonus is not present. Left Ankle - Clonus is not present. Reflexes (Dermatomes)  2/2 Normal - Left Achilles (L5-S2), Left Knee (L2-4), Right Achilles (L5-S2) and Right Knee (L2-4). Musculoskeletal  Global Assessment  Examination of related systems reveals - well-developed, well-nourished, in no acute distress, alert and oriented x 3. Gait and Station - normal gait and station and normal posture. Right Lower Extremity - normal strength and tone, normal range of motion without pain and no instability, subluxation or laxity. Left Lower Extremity - normal strength and tone, normal range of motion without pain and no instability, subluxation or laxity.   Spine/Ribs/Pelvis  Cervical Spine - Examination of the cervical spine reveals - no tenderness to palpation, no pain, no swelling, edema or erythema, normal cervical spine movements and normal sensation. Thoracic (Dorsal) Spine - Examination of the thoracic spine reveals - no tenderness over thoracic vertebrae, no pain, normal sensation and normal thoracic spine movements. Lumbosacral Spine - Examination of the lumbosacral spine reveals - no known fractures or deformities. Inspection and Palpation - Tenderness - moderate. Assessment of pain reveals the following findings - The pain is characterized as - moderate. Location - pain refers to lower back bilaterally. ROJM - Trunk Extension - 15 degrees. Lumbar Spine Flexion - 35 °. Lumbosacral Spine - Functional Testing - Babinski Test negative, Prone Knee Bending Test negative, Slump Test negative, Straight Leg Raising Test negative. Dr. Zuleyka Frost was available for immediate consult during this encounter. An electronic signature was used to authenticate this note.   -- LETI Pacheco

## 2023-11-02 ENCOUNTER — HOSPITAL ENCOUNTER (OUTPATIENT)
Facility: HOSPITAL | Age: 83
Discharge: HOME | End: 2023-11-02
Payer: MEDICARE

## 2023-11-02 DIAGNOSIS — G44.89 OTHER HEADACHE SYNDROME: ICD-10-CM

## 2023-11-02 PROCEDURE — 70551 MRI BRAIN STEM W/O DYE: CPT

## 2024-06-27 ENCOUNTER — OFFICE VISIT (OUTPATIENT)
Age: 84
End: 2024-06-27
Payer: MEDICARE

## 2024-06-27 ENCOUNTER — CLINICAL DOCUMENTATION (OUTPATIENT)
Age: 84
End: 2024-06-27

## 2024-06-27 VITALS
OXYGEN SATURATION: 96 % | WEIGHT: 170 LBS | HEART RATE: 62 BPM | BODY MASS INDEX: 26.68 KG/M2 | SYSTOLIC BLOOD PRESSURE: 122 MMHG | HEIGHT: 67 IN | DIASTOLIC BLOOD PRESSURE: 70 MMHG

## 2024-06-27 DIAGNOSIS — F03.90 DEMENTIA WITHOUT BEHAVIORAL DISTURBANCE (HCC): Primary | ICD-10-CM

## 2024-06-27 PROCEDURE — G8427 DOCREV CUR MEDS BY ELIG CLIN: HCPCS | Performed by: PSYCHIATRY & NEUROLOGY

## 2024-06-27 PROCEDURE — 96138 PSYCL/NRPSYC TECH 1ST: CPT | Performed by: PSYCHIATRY & NEUROLOGY

## 2024-06-27 PROCEDURE — G8419 CALC BMI OUT NRM PARAM NOF/U: HCPCS | Performed by: PSYCHIATRY & NEUROLOGY

## 2024-06-27 PROCEDURE — 96132 NRPSYC TST EVAL PHYS/QHP 1ST: CPT | Performed by: PSYCHIATRY & NEUROLOGY

## 2024-06-27 PROCEDURE — 1036F TOBACCO NON-USER: CPT | Performed by: PSYCHIATRY & NEUROLOGY

## 2024-06-27 PROCEDURE — G8400 PT W/DXA NO RESULTS DOC: HCPCS | Performed by: PSYCHIATRY & NEUROLOGY

## 2024-06-27 PROCEDURE — 1123F ACP DISCUSS/DSCN MKR DOCD: CPT | Performed by: PSYCHIATRY & NEUROLOGY

## 2024-06-27 PROCEDURE — 1090F PRES/ABSN URINE INCON ASSESS: CPT | Performed by: PSYCHIATRY & NEUROLOGY

## 2024-06-27 PROCEDURE — 99205 OFFICE O/P NEW HI 60 MIN: CPT | Performed by: PSYCHIATRY & NEUROLOGY

## 2024-06-27 RX ORDER — ACETAMINOPHEN 500 MG
500 TABLET ORAL EVERY 6 HOURS PRN
COMMUNITY

## 2024-06-27 RX ORDER — DONEPEZIL HYDROCHLORIDE 10 MG/1
TABLET, FILM COATED ORAL
Qty: 90 TABLET | Refills: 1 | Status: SHIPPED | OUTPATIENT
Start: 2024-06-27

## 2024-06-27 ASSESSMENT — PATIENT HEALTH QUESTIONNAIRE - PHQ9
SUM OF ALL RESPONSES TO PHQ QUESTIONS 1-9: 0
SUM OF ALL RESPONSES TO PHQ QUESTIONS 1-9: 0
2. FEELING DOWN, DEPRESSED OR HOPELESS: NOT AT ALL
SUM OF ALL RESPONSES TO PHQ QUESTIONS 1-9: 0
1. LITTLE INTEREST OR PLEASURE IN DOING THINGS: NOT AT ALL
SUM OF ALL RESPONSES TO PHQ9 QUESTIONS 1 & 2: 0
SUM OF ALL RESPONSES TO PHQ QUESTIONS 1-9: 0

## 2024-06-27 NOTE — PROGRESS NOTES
NEUROLOGY  NEW PATIENT EVALUATION/CONSULTATION       PATIENT NAME: Amanda Medellin    MRN: [id number]    REASON FOR CONSULTATION: Memory impairment    06/27/24      Previous records (physician notes, laboratory reports, and radiology reports) and imaging studies were reviewed and summarized. My recommendations will be communicated back to the patient's physician(s) via electronic medical record and/or by US mail. The patient was accompanied by her .      HISTORY OF PRESENT ILLNESS:  Amanda Medellin is a 83 y.o.  female presenting for evaluation of memory impairment.      Onset and progression: 1.5 years, stable since onset    Neuropsychiatric symptoms    By Family members account:    Problems with judgment:No   Reduced interest in hobbies/activities: No   Repeats questions, stories, or statements: Yes    Trouble recalling people's names: No   Trouble learning how to use a tool or appliance: No   Forgetting the correct month or year: No   Difficulty handling financial affairs (bill-paying, taxes): Not applicable   Difficulty remembering appointments:No    Memory: Short term recall  Language: No reported word finding difficulty   Change in personality:None  Socially inappropriate behavior:None  Change in eating habits:No  Physical changes: None  Depressive symptoms: None  Hallucinations/Delusions:None    Ability to function:  Driving:Yes, no difficulty with navigation or MVA  Finances: Handled by her   Cooking:Handled by her  mostly  Manages own medication: Assisted by her   Residing: At home with her      Prior work-up: None    Prior treatments: None      PAST MEDICAL HISTORY:  Past Medical History:   Diagnosis Date    Aneurysm (HCC)     Arthritis     THUMBS    Chronic pain     SCIATIC ON LEFT SIDE    Goiter     Hypertension     Hypothyroidism     Nausea & vomiting     Platelet inhibition due to Plavix     Sciatic leg pain        PAST SURGICAL HISTORY:  Past Surgical 
in the 12th percentile when compared to individuals of a similar age and gender, suggesting likely presence of cognitive impairment.

## 2024-06-28 ENCOUNTER — TELEPHONE (OUTPATIENT)
Age: 84
End: 2024-06-28

## 2024-06-28 LAB
TSH SERPL DL<=0.005 MIU/L-ACNC: 0.68 UIU/ML (ref 0.45–4.5)
VIT B12 SERPL-MCNC: 541 PG/ML (ref 232–1245)

## 2024-06-28 NOTE — TELEPHONE ENCOUNTER
----- Message from Flower Amaya DO sent at 6/28/2024 11:09 AM EDT -----  Labs reviewed without significant abnormalities. RMD    ----- Message -----  From: Mracos Cosme Incoming Amb Ref Lab Orders To [facility]  Sent: 6/28/2024   5:37 AM EDT  To: Flower Amaya DO

## 2024-08-26 ENCOUNTER — TELEPHONE (OUTPATIENT)
Age: 84
End: 2024-08-26

## 2024-10-25 DIAGNOSIS — F03.90 DEMENTIA WITHOUT BEHAVIORAL DISTURBANCE (HCC): ICD-10-CM

## 2024-10-28 RX ORDER — DONEPEZIL HYDROCHLORIDE 10 MG/1
TABLET, FILM COATED ORAL
Qty: 90 TABLET | Refills: 3 | OUTPATIENT
Start: 2024-10-28

## 2024-11-22 DIAGNOSIS — F03.90 DEMENTIA WITHOUT BEHAVIORAL DISTURBANCE (HCC): ICD-10-CM

## 2024-11-25 RX ORDER — DONEPEZIL HYDROCHLORIDE 10 MG/1
TABLET, FILM COATED ORAL
Qty: 90 TABLET | Refills: 0 | Status: SHIPPED | OUTPATIENT
Start: 2024-11-25

## 2025-01-16 ENCOUNTER — OFFICE VISIT (OUTPATIENT)
Age: 85
End: 2025-01-16
Payer: MEDICARE

## 2025-01-16 VITALS
WEIGHT: 158 LBS | BODY MASS INDEX: 25.39 KG/M2 | HEART RATE: 78 BPM | OXYGEN SATURATION: 97 % | HEIGHT: 66 IN | SYSTOLIC BLOOD PRESSURE: 132 MMHG | DIASTOLIC BLOOD PRESSURE: 80 MMHG

## 2025-01-16 DIAGNOSIS — F03.90 DEMENTIA WITHOUT BEHAVIORAL DISTURBANCE (HCC): ICD-10-CM

## 2025-01-16 PROCEDURE — 99213 OFFICE O/P EST LOW 20 MIN: CPT | Performed by: PSYCHIATRY & NEUROLOGY

## 2025-01-16 PROCEDURE — 1159F MED LIST DOCD IN RCRD: CPT | Performed by: PSYCHIATRY & NEUROLOGY

## 2025-01-16 PROCEDURE — 1036F TOBACCO NON-USER: CPT | Performed by: PSYCHIATRY & NEUROLOGY

## 2025-01-16 PROCEDURE — 96132 NRPSYC TST EVAL PHYS/QHP 1ST: CPT | Performed by: PSYCHIATRY & NEUROLOGY

## 2025-01-16 PROCEDURE — 96138 PSYCL/NRPSYC TECH 1ST: CPT | Performed by: PSYCHIATRY & NEUROLOGY

## 2025-01-16 PROCEDURE — G8427 DOCREV CUR MEDS BY ELIG CLIN: HCPCS | Performed by: PSYCHIATRY & NEUROLOGY

## 2025-01-16 PROCEDURE — 1090F PRES/ABSN URINE INCON ASSESS: CPT | Performed by: PSYCHIATRY & NEUROLOGY

## 2025-01-16 PROCEDURE — G8419 CALC BMI OUT NRM PARAM NOF/U: HCPCS | Performed by: PSYCHIATRY & NEUROLOGY

## 2025-01-16 PROCEDURE — 1123F ACP DISCUSS/DSCN MKR DOCD: CPT | Performed by: PSYCHIATRY & NEUROLOGY

## 2025-01-16 PROCEDURE — 1125F AMNT PAIN NOTED PAIN PRSNT: CPT | Performed by: PSYCHIATRY & NEUROLOGY

## 2025-01-16 PROCEDURE — G8400 PT W/DXA NO RESULTS DOC: HCPCS | Performed by: PSYCHIATRY & NEUROLOGY

## 2025-01-16 RX ORDER — DONEPEZIL HYDROCHLORIDE 10 MG/1
10 TABLET, FILM COATED ORAL NIGHTLY
Qty: 90 TABLET | Refills: 1 | Status: SHIPPED | OUTPATIENT
Start: 2025-01-16

## 2025-01-16 NOTE — PROGRESS NOTES
Neurology Clinic Follow up Note    Patient ID:  Amanda Medellin  [id number]  84 y.o.  1940      Ms. Medellin is here for follow up today of  Chief Complaint   Patient presents with    OTHER     Pt returning for H/O Dementia  Pt feels things are the same  reports things seem pretty stable definitely ups and downs           Last Appointment With Me:  6/27/2024       Interval History:   Pt returns for f/u of cognitive symptoms.   She reports memory overall is stable from last visit. She is still having difficulty reading/concentrating.    is still assisting with medications.   Tolerating Aricept without noted side effects.     Ability to function:  Driving:Yes, no difficulty with navigation or MVA  Finances: Handled by her   Cooking:Handled by her  mostly  Manages own medication: Assisted by her   Residing: At home with her      PMHx/ PSHx/ FHx/ SHx:  Reviewed and unchanged previous visit.   Past Medical History:   Diagnosis Date    Aneurysm (HCC)     Arthritis     THUMBS    Chronic pain     SCIATIC ON LEFT SIDE    Goiter     Hypertension     Hypothyroidism     Nausea & vomiting     Platelet inhibition due to Plavix     Sciatic leg pain        ROS:  Comprehensive review of systems negative except for as noted above.       Objective:       Meds:  Current Outpatient Medications   Medication Sig Dispense Refill    donepezil (ARICEPT) 10 MG tablet 1  TABLET BY MOUTH EVERY NIGHT AT  BEDTIME 90 tablet 0    acetaminophen (TYLENOL) 500 MG tablet Take 1 tablet by mouth every 6 hours as needed for Pain      TURMERIC PO Take 1 tablet by mouth      aspirin 81 MG chewable tablet Take 1 tablet by mouth daily      vitamin D (CHOLECALCIFEROL) 25 MCG (1000 UT) TABS tablet Take by mouth      levothyroxine (SYNTHROID) 75 MCG tablet Take 1 tablet by mouth Daily      lisinopril (PRINIVIL;ZESTRIL) 5 MG tablet Take 1 tablet by mouth daily       No current facility-administered medications for this

## 2025-01-16 NOTE — PROGRESS NOTES
64187 (16 minute minimum)  __16_ minutes were spent administering cognitive testing by medical assistant/nurse.   79387 (31 minute minimum)   _31__ minutes were spent reviewing and interpreting the cognitive testing results, integrating patient data, and discussing the results with the patient.      Cognitive Testing Evaluation     Introduction:     Amanda Medellin  1940  Female   This 84 year old Female was administered a battery of neurocognitive testing on 01/16/2025.     Tests Administered:     Trails A, Trails B, Digit Symbol Substitution, Stroop, Immediate Recognition, Delayed Recognition   The combined test administration time was 13 minutes   Test Results:   Cognitive testing was provided via a battery of cognitive assessments. The pattern of test scores indicate that results are valid.  A Clinical Report with further description of scores and results is also available.   Overall: Patient tested in the 47th* percentile (standard score of 99*).   Trails A: Patient tested in the 34th percentile (scaled standard score of 94).  Trails B: Patient tested in the --* percentile (scaled standard score of null).  Digit Symbol Substitution: Patient tested in the 71st percentile (scaled standard score of 108).  Stroop: Patient tested in the 81st percentile (scaled standard score of 113).  Immediate Recognition: Patient tested in the 6th percentile (scaled standard score of 76).  Delayed Recognition: Patient tested in the 12th percentile (scaled standard score of 82).   * These assessments were not scored because they were potentially invalid, or the patient failed to complete in the allotted time.   Interpretation of Test Scores:     Examination of individual component tests shows:   Attention - Trails A: unlikely impairment  Mental Flexibility - Trails B: possible impairment  Executive Function - Digit Symbol Substitution: unlikely impairment  Executive Function - Stroop: unlikely impairment  Memory - Immediate

## 2025-03-28 ENCOUNTER — TELEPHONE (OUTPATIENT)
Age: 85
End: 2025-03-28

## 2025-03-28 NOTE — TELEPHONE ENCOUNTER
Called and left a message for patient to call back and discuss upcoming appointments with Dr. Parrish.  Need to reschedule the testing appointment due to psychometrist being out.

## 2025-04-07 ENCOUNTER — OFFICE VISIT (OUTPATIENT)
Age: 85
End: 2025-04-07
Payer: MEDICARE

## 2025-04-07 ENCOUNTER — PROCEDURE VISIT (OUTPATIENT)
Age: 85
End: 2025-04-07
Payer: MEDICARE

## 2025-04-07 DIAGNOSIS — G30.8: Primary | ICD-10-CM

## 2025-04-07 DIAGNOSIS — R41.3 SHORT-TERM MEMORY LOSS: Primary | ICD-10-CM

## 2025-04-07 DIAGNOSIS — F02.A3: Primary | ICD-10-CM

## 2025-04-07 PROCEDURE — 96133 NRPSYC TST EVAL PHYS/QHP EA: CPT | Performed by: STUDENT IN AN ORGANIZED HEALTH CARE EDUCATION/TRAINING PROGRAM

## 2025-04-07 PROCEDURE — 96139 PSYCL/NRPSYC TST TECH EA: CPT | Performed by: STUDENT IN AN ORGANIZED HEALTH CARE EDUCATION/TRAINING PROGRAM

## 2025-04-07 PROCEDURE — 96132 NRPSYC TST EVAL PHYS/QHP 1ST: CPT | Performed by: STUDENT IN AN ORGANIZED HEALTH CARE EDUCATION/TRAINING PROGRAM

## 2025-04-07 PROCEDURE — 90791 PSYCH DIAGNOSTIC EVALUATION: CPT | Performed by: STUDENT IN AN ORGANIZED HEALTH CARE EDUCATION/TRAINING PROGRAM

## 2025-04-07 PROCEDURE — 96138 PSYCL/NRPSYC TECH 1ST: CPT | Performed by: STUDENT IN AN ORGANIZED HEALTH CARE EDUCATION/TRAINING PROGRAM

## 2025-04-07 NOTE — PROGRESS NOTES
INTAKE NOTE     Name: Amanda Medellin MRN: [id number]   Age: 84 y.o.  YOB: 1940   Gender: female Date of Intake: 4/7/2025   Race/Ethnicity: White (non-)     Education: High school     Handedness: Right Referral Source: Flower Amaya DO (Neurology)     EVALUATION METHODS: The following information was gathered from a clinical interview with Ms. Medellin, her , Mr. Mejia Medellin, and a review of available medical records. This evaluation was conducted for clinical treatment planning and may not be valid for other purposes. Potential risks and benefits, limits of confidentiality, and evaluation procedures were discussed. Ms. Medellin expressed an understanding of the purpose of the visit and consented to the procedures.     REASON FOR REFERRAL: Ms. Medellin was referred by her neurologist to evaluate cognitive functioning and rule out impairment in the context of cognitive impairment. She was seen for an initial consultation with Neurology on 6/27/2024 with memory impairment over the past 1.5 years. MoCA=15/30, Braincheck = 12th percentile, suggesting likely presence of cognitive impairment. 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 Medical history is notable for an aneurysm s/p coil embolization. Goal of evaluation is to rule out neurodegenerative factors. Information gathered today will assist in differential diagnosis and treatment planning/recommendations.     PRESENTING CONCERNS     Cognitive Functioning: Ms. Medellin reported that she has not noticed any changes to her cognition, nor was she concerned about her cognitive health in general. Per collateral report, however, cognitive changes have been noticed for the past three years, primarily with regard to short-term memory. She is repetitious in conversations and with questions, both of which were observed firstand during today's visit. Dates and the day of the week are hard to keep

## 2025-04-07 NOTE — PATIENT INSTRUCTIONS
Thank you for choosing us for your neuropsychological evaluation. We will examine your overall cognitive functioning, including areas such as memory, attention, concentration, language, and problem solving.   You were seen for an initial visit by Dr. Lisa Parrish (Neuropsychologist) today. Please refer to the dates listed under \"What's Next\" for your scheduled Procedure (testing) and Follow-Up appointments.    Reminders for your testing appointment:   If you wear glasses/contacts and/or hearing assistive devices, please be sure to bring them with you to your testing appointment.   You will be working with my Psychometrist, Riana, on the day of testing. She is specially trained to administer these cognitive tests to you.   Please prepare accordingly, as the duration of testing may take a few hours to complete.     To reschedule or cancel your appointment, please call [phone].   In case of an emergency, call [phone] or report to the nearest emergency department.  It has been our pleasure to work with you, and we look forward to speaking with you soon.

## 2025-04-17 NOTE — PROGRESS NOTES
NEUROPSYCHOLOGICAL EVALUATION REPORT  Confidential     Name:  Amanda Medellin MRN:  [id number]   Age:  84 y.o. YOB: 1940   Gender:  female Date of Intake:  2025   Race/Ethnicity:  White (non-) Date of Testin2025    Education:  High school     Handedness:  Right Referral Source:  Flower Amaya DO (Neurology)     EVALUATION METHODS: The following information was gathered from a clinical interview with Ms. Medellin, her , Mr. Mejia Medellin, and a review of available medical records. Information was also gathered from the administered neuropsychological tests that were completed in-person. This evaluation was conducted for clinical treatment planning and may not be valid for other purposes. Potential risks and benefits, limits of confidentiality, and evaluation procedures were discussed. Ms. Medellin consented to have this report made available to her treating providers through her electronic medical records. She expressed an understanding of the purpose of the visit and consented to the procedures.     REASON FOR REFERRAL: Ms. Medellin was referred for neuropsychological evaluation by her neurologist to evaluate cognitive functioning and rule out impairment in the context of cognitive impairment. She was seen for an initial consultation with Neurology on 2024 with memory impairment over the past 1.5 years. MoCA=15/30, Braincheck = 12th percentile, suggesting likely presence of cognitive impairment. 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 Medical history is notable for an aneurysm s/p coil embolization. Goal of evaluation is to rule out neurodegenerative factors. This is her first neuropsychological evaluation.    PRESENTING CONCERNS     Cognitive Functioning: Ms. Medellin reported that she has not noticed any changes to her cognition, nor was she concerned about her cognitive health in general. Per collateral report,

## 2025-04-18 ENCOUNTER — OFFICE VISIT (OUTPATIENT)
Age: 85
End: 2025-04-18
Payer: MEDICARE

## 2025-04-18 DIAGNOSIS — G30.8: Primary | ICD-10-CM

## 2025-04-18 DIAGNOSIS — F02.A3: Primary | ICD-10-CM

## 2025-04-18 PROCEDURE — 96132 NRPSYC TST EVAL PHYS/QHP 1ST: CPT | Performed by: STUDENT IN AN ORGANIZED HEALTH CARE EDUCATION/TRAINING PROGRAM

## 2025-04-18 NOTE — PROGRESS NOTES
NEUROPSYCHOLOGICAL EVALUATION   Feedback     Prior to seeing the patient for today's visit, I reviewed pertinent records, including the previously completed report, the records in Epic, and any updated visits from other providers since the patient's last visit. Ms. Amanda Medellin was seen for a feedback appointment via in-person to discuss the results of her neuropsychological evaluation. Her , Mr. Medellin, was also present.     DISCUSSION OF RESULTS AND RECOMMENDATIONS:   I provided feedback about Ms. Medellin's neuropsychological testing results in detail:   Cognitive diagnosis: Mild dementia, Alzheimer's disease top of differential, do not rule out vascular or mixed AD/vascular.   Ms. Medellin and her  expressed agreement with the results and that the findings were consistent with what is noticed in daily life.     Recommendations from the report were discussed in detail. She and her  expressed understanding. The patient and/or their care partner(s) will:   Follow-up with Dr. Amaya in August to discuss continuity of care and medication adjustments if needed.  feels the donepezil is doing its job at keeping her stable/slowing progression.   Use strengths to her advantage - we discussed cognitive strengths.   Identify and engage in enjoyable activities to optimize cognitive and overall health.   Re-evaluation in 12-18 months.     I answered any questions in detail, provided her with a printed copy of her report, and encouraged her and her family to contact us with any further questions or concerns moving forward.     MENTAL STATUS:  Arrival: On time and accompanied by her .  General appearance: Appropriately dressed and well-groomed.   Orientation: Not oriented to situation, but expressed understanding when provided with further clarification.   Level of consciousness: Alert, attentive  Ambulation/Gait: Ambulated independently; gait was unremarkable.   Motor: WNL.   Sensory: Vision

## 2025-06-17 DIAGNOSIS — F03.90 DEMENTIA WITHOUT BEHAVIORAL DISTURBANCE (HCC): ICD-10-CM

## 2025-06-19 RX ORDER — DONEPEZIL HYDROCHLORIDE 10 MG/1
10 TABLET, FILM COATED ORAL NIGHTLY
Qty: 90 TABLET | Refills: 3 | OUTPATIENT
Start: 2025-06-19

## 2025-08-08 DIAGNOSIS — F03.90 DEMENTIA WITHOUT BEHAVIORAL DISTURBANCE (HCC): ICD-10-CM

## 2025-08-08 RX ORDER — DONEPEZIL HYDROCHLORIDE 10 MG/1
10 TABLET, FILM COATED ORAL NIGHTLY
Qty: 30 TABLET | Refills: 0 | Status: SHIPPED | OUTPATIENT
Start: 2025-08-08

## 2025-08-14 ENCOUNTER — OFFICE VISIT (OUTPATIENT)
Age: 85
End: 2025-08-14
Payer: MEDICARE

## 2025-08-14 VITALS
SYSTOLIC BLOOD PRESSURE: 128 MMHG | OXYGEN SATURATION: 97 % | BODY MASS INDEX: 26.2 KG/M2 | WEIGHT: 163 LBS | DIASTOLIC BLOOD PRESSURE: 70 MMHG | HEART RATE: 60 BPM | HEIGHT: 66 IN

## 2025-08-14 DIAGNOSIS — G30.8 MODERATE ALZHEIMER'S DEMENTIA OF OTHER ONSET WITH MOOD DISTURBANCE (HCC): Primary | ICD-10-CM

## 2025-08-14 DIAGNOSIS — F03.90 DEMENTIA WITHOUT BEHAVIORAL DISTURBANCE (HCC): Primary | ICD-10-CM

## 2025-08-14 DIAGNOSIS — F02.B3 MODERATE ALZHEIMER'S DEMENTIA OF OTHER ONSET WITH MOOD DISTURBANCE (HCC): Primary | ICD-10-CM

## 2025-08-14 PROCEDURE — 99214 OFFICE O/P EST MOD 30 MIN: CPT | Performed by: PSYCHIATRY & NEUROLOGY

## 2025-08-14 PROCEDURE — 96132 NRPSYC TST EVAL PHYS/QHP 1ST: CPT | Performed by: PSYCHIATRY & NEUROLOGY

## 2025-08-14 PROCEDURE — 96136 PSYCL/NRPSYC TST PHY/QHP 1ST: CPT | Performed by: PSYCHIATRY & NEUROLOGY

## 2025-08-14 RX ORDER — DONEPEZIL HYDROCHLORIDE 10 MG/1
10 TABLET, FILM COATED ORAL NIGHTLY
Qty: 90 TABLET | Refills: 3 | Status: SHIPPED | OUTPATIENT
Start: 2025-08-14

## (undated) DEVICE — PREP SKN PREVAIL 40ML APPL --

## (undated) DEVICE — ARGYLE FRAZIER SURGICAL SUCTION INSTRUMENT 10 FR/CH (3.3 MM): Brand: ARGYLE

## (undated) DEVICE — SUTURE MCRYL SZ 3-0 L27IN ABSRB UD L19MM PS-2 3/8 CIR PRIM Y427H

## (undated) DEVICE — STERILE POLYISOPRENE POWDER-FREE SURGICAL GLOVES WITH EMOLLIENT COATING: Brand: PROTEXIS

## (undated) DEVICE — LIGHT HANDLE: Brand: DEVON

## (undated) DEVICE — DISPOSABLE TOURNIQUET CUFF SINGLE BLADDER, DUAL PORT AND QUICK CONNECT CONNECTOR: Brand: COLOR CUFF

## (undated) DEVICE — SUT ETHLN 3-0 18IN PS1 BLK --

## (undated) DEVICE — DEVON™ KNEE AND BODY STRAP 60" X 3" (1.5 M X 7.6 CM): Brand: DEVON

## (undated) DEVICE — STERILE POLYISOPRENE POWDER-FREE SURGICAL GLOVES: Brand: PROTEXIS

## (undated) DEVICE — SUTURE VCRL SZ 3-0 L27IN ABSRB UD L26MM SH 1/2 CIR J416H

## (undated) DEVICE — STRETCH BANDAGE ROLL: Brand: DERMACEA

## (undated) DEVICE — MEDI-VAC NON-CONDUCTIVE SUCTION TUBING: Brand: CARDINAL HEALTH

## (undated) DEVICE — DRAPE CARM MINI FOR IMAG SYS INSIGHT FLROSCN

## (undated) DEVICE — INFECTION CONTROL KIT SYS

## (undated) DEVICE — SUTURE ETHLN SZ 4-0 L18IN NONABSORBABLE BLK L16MM PC-3 3/8 1864G

## (undated) DEVICE — PACK,BASIC,SIRUS,V: Brand: MEDLINE

## (undated) DEVICE — SURGICAL PROCEDURE PACK BASIN MAJ SET CUST NO CAUT

## (undated) DEVICE — HOOK LOCK LATEX FREE ELASTIC BANDAGE D/L 6INX10YD

## (undated) DEVICE — SUTURE VCRL 2-0 L27IN ABSRB UD PS-2 L19MM 1/2 CIR J428H

## (undated) DEVICE — C-WIRE PAK DOUBLE ENDED ORTHOPAEDIC WIRE, TROCAR, .062" (1.57 MM): Brand: C-WIRE

## (undated) DEVICE — GAUZE SPONGES,12 PLY: Brand: CURITY

## (undated) DEVICE — PADDING CST CRMPD 3INX4YD NS --

## (undated) DEVICE — OCCLUSIVE GAUZE STRIP,3% BISMUTH TRIBROMOPHENATE IN PETROLATUM BLEND: Brand: XEROFORM

## (undated) DEVICE — SOLUTION IV 1000ML 0.9% SOD CHL

## (undated) DEVICE — REM POLYHESIVE ADULT PATIENT RETURN ELECTRODE: Brand: VALLEYLAB

## (undated) DEVICE — PENCIL ES L3M BTTN SWCH S STL HEX LOK BLDE ELECTRD HOLSTER

## (undated) DEVICE — INTENDED FOR TISSUE SEPARATION, AND OTHER PROCEDURES THAT REQUIRE A SHARP SURGICAL BLADE TO PUNCTURE OR CUT.: Brand: BARD-PARKER ® CARBON RIB-BACK BLADES

## (undated) DEVICE — DRAPE,EXTREMITY,89X128,STERILE: Brand: MEDLINE